# Patient Record
Sex: MALE | Race: WHITE | NOT HISPANIC OR LATINO | URBAN - NONMETROPOLITAN AREA
[De-identification: names, ages, dates, MRNs, and addresses within clinical notes are randomized per-mention and may not be internally consistent; named-entity substitution may affect disease eponyms.]

---

## 2019-02-21 NOTE — PATIENT DISCUSSION
PT WANTS TO GO HOME AND THINK ABOUT IT. ALSO WANTS TO THINK ABOUT IMPRIMIS. CANDIDATE FOR ANY LENS OPTION. WILL SEE [facility] TODAY TO SCHEDULE TENTATIVE DATES.

## 2019-04-01 NOTE — PATIENT DISCUSSION
PT WANTS TO GO HOME AND THINK ABOUT IT. ALSO WANTS TO THINK ABOUT IMPRIMIS. CANDIDATE FOR ANY LENS OPTION. WILL SEE [facility] TODAY TO SCHEDULE TENTATIVE DATES.

## 2019-04-01 NOTE — PATIENT DISCUSSION
PT WANTS TO GO HOME AND THINK ABOUT IT. ALSO WANTS TO THINK ABOUT IMPRIMIS. CANDIDATE FOR ANY LENS OPTION. WILL SEE [facility] TODAY TO SCHEDULE TENTATIVE DATES.

## 2020-08-25 ENCOUNTER — IMPORTED ENCOUNTER (OUTPATIENT)
Dept: URBAN - NONMETROPOLITAN AREA CLINIC 1 | Facility: CLINIC | Age: 85
End: 2020-08-25

## 2020-08-25 PROBLEM — H02.224: Noted: 2020-08-25

## 2020-08-25 PROBLEM — H02.222: Noted: 2020-08-25

## 2020-08-25 PROCEDURE — 99203 OFFICE O/P NEW LOW 30 MIN: CPT

## 2020-08-25 NOTE — PATIENT DISCUSSION
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 Gavin Campa for possible surgical intervention.

## 2022-04-09 ASSESSMENT — VISUAL ACUITY
OS_CC: 20/30-1
OD_CC: 20/25-2

## 2022-05-19 NOTE — PATIENT DISCUSSION
Discussed lid hygiene, warm compress and eyelid wash. 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 Plan: Not visually significant. RTC if vision changes.

## 2023-01-01 NOTE — PATIENT DISCUSSION
Called pharmacy and gave verbal order.     Ingrid MARRERO RN     Discussed condition and exacerbating conditions/situations (e.g., dry/arid environments, overhead fans, air conditioners, side effect of medications).